# Patient Record
Sex: FEMALE | Race: WHITE | Employment: FULL TIME | URBAN - METROPOLITAN AREA
[De-identification: names, ages, dates, MRNs, and addresses within clinical notes are randomized per-mention and may not be internally consistent; named-entity substitution may affect disease eponyms.]

---

## 2017-02-07 ENCOUNTER — TELEPHONE (OUTPATIENT)
Dept: OBGYN CLINIC | Age: 32
End: 2017-02-07

## 2017-02-07 NOTE — TELEPHONE ENCOUNTER
Pt informed of MD recommendations and she does not want to take the provera so she will take another pregnancy test in one week and if it is negative she will call back for a prescription for her birth control.

## 2017-02-07 NOTE — TELEPHONE ENCOUNTER
I would recommend she repeat the pregnancy test in 1 week, if negative then she can either start her pill then or call for a prescription for provera. She would take the Provera for 10 days and it will  make her start her period. If she starts the pill when she is not on her period she may have breakthrough bleeding.

## 2017-02-07 NOTE — TELEPHONE ENCOUNTER
Pt called stating that she had her LMP on 1/3/17 and that she thinks she is a few days late to start her period. She is not currently taking any birth control. She has taken 2 birth control tests which have come back negative. She really would like to get started on birth control again, however, I told her that if she was pregnant that it might be too early to tell with a urine pregnancy test and that she will not be able to start on birth control until we find out for certain that she is not pregnant. She wanted to know what she needs to do? Please advise.

## 2017-02-17 ENCOUNTER — TELEPHONE (OUTPATIENT)
Dept: OBGYN CLINIC | Age: 32
End: 2017-02-17

## 2017-02-17 NOTE — TELEPHONE ENCOUNTER
Pt called and stated she has not had her period yet. States she did a UPT and was negative. States she would like to start OCP. Advised patient to make an appt to be evaluated by MD. Also,informed patient that  will be back Monday and pt was fine with that. Pt was transferred to scheduling for an appt for Monday.

## 2017-02-20 NOTE — TELEPHONE ENCOUNTER
Can do provera 10 mg 1 po daily x 10 , she should come on her period after this. If she does not then needs to schedule an appointment for exam.  If she does come on her period then she can start ocps then.

## 2017-02-20 NOTE — TELEPHONE ENCOUNTER
Returned call to patient and informed her of MD recommendation. Patient declined to take the Provera but has an appt tomorrow morning to be evaluated instead.      АНДРЕЙ

## 2017-02-21 ENCOUNTER — OFFICE VISIT (OUTPATIENT)
Dept: OBGYN CLINIC | Age: 32
End: 2017-02-21

## 2017-02-21 VITALS
BODY MASS INDEX: 31.07 KG/M2 | DIASTOLIC BLOOD PRESSURE: 74 MMHG | SYSTOLIC BLOOD PRESSURE: 122 MMHG | WEIGHT: 182 LBS | HEIGHT: 64 IN

## 2017-02-21 DIAGNOSIS — N91.2 ABSENT MENSES: Primary | ICD-10-CM

## 2017-02-21 LAB
HCG URINE, QL. (POC): NEGATIVE
VALID INTERNAL CONTROL?: YES

## 2017-02-21 NOTE — PROGRESS NOTES
Amenorrhea note      Galina Hills is a 36 y.o. female who complains of absence of menses. Her current method of family planning is none. The patient is sexually active. She developed this problem approximately 3 weeks ago. Patient's last menstrual period was 01/03/2017. She has had essentially no vaginal bleeding since her LMP. Her previous menses she describes as light, moderate lasting up to 5 days. Pad or tampon count: changes every several hours. Associated symptoms include none. Alleviating factors: none    Aggravating factors: none      Last Pap smear:was normal.    Her relevant past medical history:   Past Medical History   Diagnosis Date    Headache(784.0)     Hypothyroid      resolved after pregnancy    Thyroid disease         Past Surgical History   Procedure Laterality Date    Hx tonsillectomy       Social History     Occupational History    Not on file. Social History Main Topics    Smoking status: Never Smoker    Smokeless tobacco: Never Used    Alcohol use No    Drug use: No    Sexual activity: Yes     Partners: Male     Birth control/ protection: None     Family History   Problem Relation Age of Onset    Dementia Maternal Grandmother     Heart Disease Maternal Grandmother     Heart Disease Maternal Grandfather     Cancer Paternal Grandmother        No Known Allergies  Prior to Admission medications    Medication Sig Start Date End Date Taking? Authorizing Provider   norethindrone (MICRONOR) 0.35 mg tablet Take 1 Tab by mouth daily. 1/6/16   Dionicio Cogan, MD   oxyCODONE-acetaminophen (PERCOCET) 5-325 mg per tablet Take 1-2 Tabs by mouth every four (4) hours as needed for Pain. Max Daily Amount: 12 Tabs. 11/20/15   Dionicio Cogan, MD   butalbital-acetaminophen-caffeine (FIORICET, ESGIC) -40 mg per tablet Take 1 Tab by mouth every six (6) hours as needed for Pain. Max Daily Amount: 4 Tabs.  7/27/15   Dionicio Cogan, MD   5 [facility] HYDROXYPROGESTERONE 250 mg by IntraMUSCular route every seven (7) days. 17 Alpha Hydroxyprogesterone 250mg / 1ml (4- 1 ml unit dose syringes) 6/2/15   Dionicio Cogan, MD   PRENATAL VIT W-CA,FE,FA,<1 MG, (PRENATAL VITAMIN PO) Take  by mouth. Historical Provider   levothyroxine (SYNTHROID) 100 mcg tablet Take  by mouth Daily (before breakfast). Historical Provider   propranolol LA (INDERAL LA) 60 mg SR capsule Take 1 Cap by mouth daily. 5/30/13   Jacinto Thrasher MD   rizatriptan (MAXALT-MLT) 10 mg disintegrating tablet Take 1 Tab by mouth once as needed for Migraine for 1 dose.  5/30/13   Renetta Mendoza MD        Review of Systems - History obtained from the patient  Constitutional: negative for weight loss, fever, night sweats  HEENT: negative for hearing loss, earache, congestion, snoring, sorethroat  CV: negative for chest pain, palpitations, edema  Resp: negative for cough, shortness of breath, wheezing  Breast: negative for breast lumps, nipple discharge, galactorrhea  GI: negative for change in bowel habits, abdominal pain, black or bloody stools  : negative for frequency, dysuria, hematuria  MSK: negative for back pain, joint pain, muscle pain  Skin: negative for itching, rash, hives  Neuro: negative for dizziness, headache, confusion, weakness  Psych: negative for anxiety, depression, change in mood  Heme/lymph: negative for bleeding, bruising, pallor      Objective:  Visit Vitals    /74    Ht 5' 4\" (1.626 m)    Wt 182 lb (82.6 kg)    LMP 01/03/2017    BMI 31.24 kg/m2          PHYSICAL EXAMINATION    Constitutional  · Appearance: well-nourished, well developed, alert, in no acute distress    HENT  · Head and Face: appears normal    Neck  · Inspection/Palpation: normal appearance, no masses or tenderness  · Lymph Nodes: no lymphadenopathy present  · Thyroid: gland size normal, nontender, no nodules or masses present on palpation    Gastrointestinal  · Abdominal Examination: abdomen non-tender to palpation, normal bowel sounds, no masses present  · Liver and spleen: no hepatomegaly present, spleen not palpable  · Hernias: no hernias identified    Skin  · General Inspection: no rash, no lesions identified    Neurologic/Psychiatric  · Mental Status:  · Orientation: grossly oriented to person, place and time  · Mood and Affect: mood normal, affect appropriate    Assessment/Plan:   Amenorrhea, possibly due to anovulation this month. Cycles otherwise normal.  Will check tsh, prolactin, hcg. Start on ocps. Instructions given to pt. Handouts given to pt.

## 2017-02-22 LAB
HCG INTACT+B SERPL-ACNC: <1 MIU/ML
PROLACTIN SERPL-MCNC: 9.4 NG/ML (ref 4.8–23.3)
T4 FREE SERPL-MCNC: 1.16 NG/DL (ref 0.82–1.77)
TSH SERPL DL<=0.005 MIU/L-ACNC: 3.34 UIU/ML (ref 0.45–4.5)

## 2018-01-19 ENCOUNTER — OFFICE VISIT (OUTPATIENT)
Dept: NEUROLOGY | Age: 33
End: 2018-01-19

## 2018-01-19 VITALS
WEIGHT: 196 LBS | SYSTOLIC BLOOD PRESSURE: 122 MMHG | DIASTOLIC BLOOD PRESSURE: 78 MMHG | OXYGEN SATURATION: 99 % | HEART RATE: 79 BPM | BODY MASS INDEX: 33.64 KG/M2

## 2018-01-19 DIAGNOSIS — G43.111 INTRACTABLE MIGRAINE WITH AURA WITH STATUS MIGRAINOSUS: Primary | ICD-10-CM

## 2018-01-19 DIAGNOSIS — G44.86 CERVICOGENIC HEADACHE: ICD-10-CM

## 2018-01-19 RX ORDER — PROPRANOLOL HYDROCHLORIDE 60 MG/1
60 CAPSULE, EXTENDED RELEASE ORAL DAILY
Qty: 30 CAP | Refills: 5 | Status: SHIPPED | OUTPATIENT
Start: 2018-01-19

## 2018-01-19 NOTE — MR AVS SNAPSHOT
[address] [facility] [address] [facility] [address] [facility] [id number] [phone] Patient: Jb Hare MRN: [id number] :1985 Visit Information Date & Time Provider Department Dept. Phone Encounter #  
 2018  9:00 AM Jessica Bullock MD [facility] [phone] [id number] Follow-up Instructions Return in about 1 month (around 2018). Upcoming Health Maintenance Date Due Influenza Age 5 to Adult 2017 PAP AKA CERVICAL CYTOLOGY 2019 DTaP/Tdap/Td series (2 - Td) 2025 Allergies as of 2018  Review Complete On: 2018 By: Andrzej Whitney LPN No Known Allergies Current Immunizations  Reviewed on 2015 Name Date Influenza Vaccine [place]) 10/19/2015 Rho(D) Immune Globulin - IM 2015 Tdap 2015 Not reviewed this visit You Were Diagnosed With   
  
 Codes Comments Intractable migraine with aura with status migrainosus    -  Primary ICD-10-CM: A11.073 ICD-9-CM: 346.03 Cervicogenic headache     ICD-10-CM: Isabella Tong ICD-9-CM: 864. 0 Vitals BP Pulse Weight(growth percentile) SpO2 BMI OB Status 122/78 79 196 lb (88.9 kg) 99% 33.64 kg/m2 Unknown Smoking Status Never Smoker BMI and BSA Data Body Mass Index Body Surface Area  
 33.64 kg/m 2 2 m 2 Preferred Pharmacy Pharmacy Name Phone [facility] [address] AT [address] [facility] [phone] Your Updated Medication List  
  
   
This list is accurate as of: 18 10:00 AM.  Always use your most recent med list.  
  
  
  
  
 propranolol LA 60 mg SR capsule Commonly known as:  INDERAL LA Take 1 Cap by mouth daily. Prescriptions Sent to Pharmacy  Refills  
 propranolol LA (INDERAL LA) 60 mg SR capsule 5  
 Sig: Take 1 Cap by mouth daily. Class: Normal  
 Pharmacy: [facility] [address] [place] [address] [id number] AT [facility] [address] ([facility] #: [phone] Route: Oral  
  
We Performed the Following REFERRAL TO PHYSICAL THERAPY [KYS33 Custom] Comments:  
 Evaluate and treat for L upper neck tightness and pain; Needs myofascial release and heat therapy Follow-up Instructions Return in about 1 month (around 2/19/2018). Referral Information Referral ID Referred By [facility]  
  
 [id number] Status Start Date End Date 1 New Request 1/19/18 1/19/19 If your referral has a status of pending review or denied, additional information will be sent to support the outcome of this decision. Patient Instructions PRESCRIPTION REFILL POLICY [facility] Statement to Patients April 1, 2014 In an effort to ensure the large volume of patient prescription refills is processed in the most efficient and expeditious manner, we are asking our patients to assist us by calling your Pharmacy for all prescription refills, this will include also your  Mail Order Pharmacy. The pharmacy will contact our office electronically to continue the refill process. Please do not wait until the last minute to call your pharmacy. We need at least 48 hours (2days) to fill prescriptions. We also encourage you to call your pharmacy before going to  your prescription to make sure it is ready. With regard to controlled substance prescription refill requests (narcotic refills) that need to be picked up at our office, we ask your cooperation by providing us with at least 72 hours (3days) notice that you will need a refill. We will not refill narcotic prescription refill requests after 4:00pm on any weekday, Monday through Thursday, or after 2:00pm on Fridays, or on the weekends. We encourage everyone to explore another way of getting your prescription refill request processed using [employer], our patient web portal through our electronic medical record system. [employer] is an efficient and effective way to communicate your medication request directly to the office and  downloadable as an kevin on your smart phone . [employer] also features a review functionality that allows you to view your medication list as well as leave messages for your physician. Are you ready to get connected? If so please review the attatched instructions or speak to any of our staff to get you set up right away! Thank you so much for your cooperation. Should you have any questions please contact our Practice Administrator. The Physicians and Staff,  [facility] Introducing [facility] SERVICES! [facility] introduces [employer] patient portal. Now you can access parts of your medical record, email your doctor's office, and request medication refills online. 1. In your internet browser, go to [email or web address]: [employer] 2. Click on the First Time User? Click Here link in the Sign In box. You will see the New Member Sign Up page. 3. Enter your [employer] Access Code exactly as it appears below. You will not need to use this code after youve completed the sign-up process. If you do not sign up before the expiration date, you must request a new code. · [employer] Access Code: [email or web address] Expires: 4/19/2018  8:44 AM 
 
4. Enter the last four digits of your Social Security Number (xxxx) and Date of Birth (mm/dd/yyyy) as indicated and click Submit. You will be taken to the next sign-up page. 5. Create a [employer] ID. This will be your [employer] login ID and cannot be changed, so think of one that is secure and easy to remember. 6. Create a [employer] password. You can change your password at any time. 7. Enter your Password Reset Question and Answer.  This can be used at a later time if you forget your password. 8. Enter your e-mail address. You will receive e-mail notification when new information is available in [address] 9. Click Sign Up. You can now view and download portions of your medical record. 10. Click the Download Summary menu link to download a portable copy of your medical information. If you have questions, please visit the Frequently Asked Questions section of the [email or web address] website. Remember, [email or web address] is NOT to be used for urgent needs. For medical emergencies, dial 911. Now available from your iPhone and Android! Please provide this summary of care documentation to your next provider. Your primary care clinician is listed as PROVIDER UNKNOWN. If you have any questions after today's visit, please call [phone].

## 2018-01-19 NOTE — PROGRESS NOTES
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 HPI:     Since 17 yo, patient has been complaining of headache described as bitemporal, lasting for 6 hrs, occurring 25/ month related to menstruation (+) nausea (+) vomiting (+) photophobia (+) phonophobia (+) visual auras - flashes of light. In 2012, patient noted L occipital headache lasting for several days    (+) neck pain    (+) headache when pooping lasting for few secs. Started few months. Patient saw Dr Juliette Dean in 2013 and was placed on Inderal LA 60 mg with benefit . Maxalt made her sick. Imitrex did not help. Stopped Inderal in 2015 when she got pregnant. Patient went to [facility] ER due to persistent headache. CT head was said to be okay. 1 week ago, patient was started on Inderal LA 60 mg with no benefit yet.         ROS  All other systems reviewed and are negative  (-) fever  (-) rash    Past Medical Hx  Past Medical History:   Diagnosis Date    Gestational hypertension     Headache(784.0)     Hypothyroid     resolved after pregnancy    Thyroid disease        Social Hx  Social History     Social History    Marital status:      Spouse name: N/A    Number of children: N/A    Years of education: N/A     Social History Main Topics    Smoking status: Never Smoker    Smokeless tobacco: Never Used    Alcohol use No    Drug use: No    Sexual activity: Yes     Partners: Male     Birth control/ protection: None     Other Topics Concern    None     Social History Narrative       Family Hx  Family History   Problem Relation Age of Onset    Dementia Maternal Grandmother     Heart Disease Maternal Grandmother     Heart Disease Maternal Grandfather     Cancer Paternal Grandmother    Mother - headaches    ALLERGIES  No Known Allergies    CURRENT MEDS  Current Outpatient Prescriptions   Medication Sig Dispense Refill    propranolol LA (INDERAL LA) 60 mg SR capsule Take 1 Cap by mouth daily. 30 Cap 5     Ibuprofen      PREVIOUS WORKUP: (reviewed)  IMAGING:    CT Results (recent):  No results found for this or any previous visit. MRI Results (recent):  No results found for this or any previous visit. IR Results (recent):  No results found for this or any previous visit. VAS/US Results (recent):  No results found for this or any previous visit. LABS (reviewed)  Results for orders placed or performed in visit on 02/21/17   TSH AND FREE T4   Result Value Ref Range    TSH 3.340 0.450 - 4.500 uIU/mL    T4, Free 1.16 0.82 - 1.77 ng/dL   PROLACTIN   Result Value Ref Range    Prolactin 9.4 4.8 - 23.3 ng/mL   TOTAL HCG, QT. Result Value Ref Range    HCG, beta, QT <1 mIU/mL   AMB POC URINE PREGNANCY TEST, VISUAL COLOR COMPARISON   Result Value Ref Range    VALID INTERNAL CONTROL POC Yes     HCG urine, Ql. (POC) Negative Negative       Physical Exam:     Visit Vitals    /78    Pulse 79    Wt 88.9 kg (196 lb)    SpO2 99%    BMI 33.64 kg/m2     General:  Alert, cooperative, no distress. Head:  Normocephalic, without obvious abnormality, atraumatic. Eyes:  Conjunctivae/corneas clear. Lungs:  Heart:   Non labored breathing  Regular rate and rhythm, no carotid bruits   Abdomen:   Soft, non-distended   Extremities: Extremities normal, atraumatic, no cyanosis or edema. Pulses: 2+ and symmetric all extremities. Skin: Skin color, texture, turgor normal. No rashes or lesions.   Neurologic Exam     Gen: Attention normal             Language: naming, repetition, fluency normal             Memory: intact recent and remote memory  Cranial Nerves:  I: smell Not tested   II: visual fields Full to confrontation   II: pupils Equal, round, reactive to light   II: optic disc No papilledema   III,VII: ptosis none   III,IV,VI: extraocular muscles  Full ROM   V: mastication normal   V: facial light touch sensation  normal   VII: facial muscle function   symmetric   VIII: hearing symmetric   IX: soft palate elevation  normal   XI: trapezius strength  5/5   XI: sternocleidomastoid strength 5/5   XI: neck flexion strength  5/5   XII: tongue  midline     Motor: normal bulk and tone, no tremor              Strength: 5/5 all four extremities  (+) tightness and tenderness L upper neck/ occipital area  Sensory: intact to LT, PP, vibration, and JPS  Reflexes: 2+ throughout; Down going toes  Coordination: Good FTN and HTS  Gait: normal gait including tandem            Impression:     Pollo Parker is a 29 y.o. female who  has a past medical history of Gestational hypertension who comes in with several types of headache. Since 17 yo, patient has been complaining of headache described as bitemporal, lasting for 6 hrs, occurring 25/ month related to menstruation associated with nausea, vomiting,  Photophobia, phonophobia and visual auras - flashes of light consistent with migraine, with visual auras, intractable. In 2012, patient noted L occipital headache lasting for several days associated with L upper neck pain and tightness suggestive of a superimposed L cervicogenic headache or possible occipital neuralgia. Lastly, patient has headache during defecation concerning for exertional headache. Patient may also have chronic caffeine headache Patient saw Dr Blanca Vasquez in 2013 and was placed on Inderal LA 60 mg with benefit . Maxalt made her sick. Imitrex did not help. Stopped Inderal in 2015 when she got pregnant. RECOMMENDATIONS  1. I had a long discussion with patient. Discussed diagnosis, prognosis, pathophysiology and available treatment. Reviewed test results. All questions were answered. 2. I personally reviewed the CT head images with the patient. Assured patient no evidence of mass or bleeding  3.  Continue Inderal LA 60 mg as migraine prophylaxis which worked in the past  4. Given samples of Cambia and Zipsor to try to abort headaches  5. Discussed the need for headache diary and went through the list that can trigger headache (chocolate, caffeine, MSG)  6. Advise to switch to decaf  7. Physical therapy referral for her L upper neck pain  8. Will consider trigger point injection/ L occipital nerve block if no improvement despite above      Follow-up Disposition:  Return in about 1 month (around 2/19/2018).       Thank you for the consultation      Luz Conway MD  Diplomate, American Board of Psychiatry and Neurology  Diplomate, Neuromuscular Medicine  Diplomate, American Board of Electrodiagnostic Medicine        CC: PROVIDER UNKNOWN  Fax: None

## 2020-09-28 ENCOUNTER — TELEPHONE (OUTPATIENT)
Dept: OBGYN CLINIC | Age: 35
End: 2020-09-28

## 2020-09-28 NOTE — TELEPHONE ENCOUNTER
Patient of FW    Patient is calling to say that she has been neglecting coming in and now she realizes what she has going on is not emergent. She said that she has a foul brown discharge vaginally. She had her urine checked somewhere and she does not have a UTI. She wants appointment today, simply because she has missed 4 days out of the last week and she \"happens to be off\". Advised me that she was offered next available FW appointment and wants to be seen today. Advised this is not emergent, but I would see what I could do. She does not want to see a male doctor, and HW is work in. She said she would make due with HW. Offered first available with him, she wants the latest appointment. Advised this is what is available, as this is not emergent and we are trying to work her in. She declined. She thanked me for my help, but declined for now.

## 2020-10-01 NOTE — PATIENT INSTRUCTIONS
Vaginitis: Care Instructions Your Care Instructions Vaginitis is soreness or infection of the vagina. This common problem can cause itching and burning. And it can cause a change in vaginal discharge. Sometimes it can cause pain during sex. Vaginitis may be caused by bacteria, yeast, or other germs. Some infections that cause it are caught from a sexual partner. Bath products, spermicides, and douches can irritate the vagina too. Some women have this problem during and after menopause. A drop in estrogen levels during this time can cause dryness, soreness, and pain during sex. Your doctor can give you medicine to treat an infection. And home care may help you feel better. For certain types of infections, your sex partner must be treated too. Follow-up care is a key part of your treatment and safety. Be sure to make and go to all appointments, and call your doctor if you are having problems. It's also a good idea to know your test results and keep a list of the medicines you take. How can you care for yourself at home? · If your doctor prescribed antibiotics, take them as directed. Do not stop taking them just because you feel better. You need to take the full course of antibiotics. · Take your medicines exactly as prescribed. Call your doctor if you think you are having a problem with your medicine. · Do not eat or drink anything that has alcohol if you are taking metronidazole (Flagyl). · If you have a yeast infection, use over-the-counter products as your doctor tells you to. Or take medicine your doctor prescribes exactly as directed. · Wash your vaginal area daily with water. You also can use a mild, unscented soap if you want. · Do not use scented bath products. And do not use vaginal sprays or douches. · Put a washcloth soaked in cool water on the area to relieve itching. Or you can take cool baths.  
· If you have dryness because of menopause, use estrogen cream or pills that your doctor prescribes. · Ask your doctor about when it is okay to have sex. · Use a personal lubricant before sex if you have dryness. Examples are Astroglide, K-Y Jelly, and Wet Lubricant Gel. · Ask your doctor if your sex partner also needs treatment. When should you call for help? Call your doctor now or seek immediate medical care if: 
  · You have a fever and pelvic pain. Watch closely for changes in your health, and be sure to contact your doctor if: 
  · You have bleeding other than your period.  
  · You do not get better as expected. Where can you learn more? Go to http://www.gray.com/ Enter B514 in the search box to learn more about \"Vaginitis: Care Instructions. \" Current as of: November 8, 2019               Content Version: 12.6 © 2629-9585 [employer]. Care instructions adapted under license by [employer] (which disclaims liability or warranty for this information). If you have questions about a medical condition or this instruction, always ask your healthcare professional. [address] any warranty or liability for your use of this information.

## 2020-10-01 NOTE — PROGRESS NOTES
Chief Complaint   No chief complaint on file. HPI  37 y.o. female complains of dark and bloody vaginal discharge. No LMP recorded. She admits to additional symptoms at this time. Lower back pain  The patient  denies aggravating factors  She denies exposure to new chemicals ot hygenic agents  Previous treatment included:  None  She also reports a right sided low back pain that feels deep and does not feel like it is associated with her muscles. Past Medical History:   Diagnosis Date    Gestational hypertension     Headache(784.0)     Hypothyroid     resolved after pregnancy    Migraine with aura 01/2019    Thyroid disease      Past Surgical History:   Procedure Laterality Date    HX TONSILLECTOMY       Social History     Occupational History    Not on file   Tobacco Use    Smoking status: Never Smoker    Smokeless tobacco: Never Used   Substance and Sexual Activity    Alcohol use: No    Drug use: No    Sexual activity: Yes     Partners: Male     Birth control/protection: None     Family History   Problem Relation Age of Onset    Dementia Maternal Grandmother     Heart Disease Maternal Grandmother     Heart Disease Maternal Grandfather     Cancer Paternal Grandmother         No Known Allergies  Prior to Admission medications    Medication Sig Start Date End Date Taking? Authorizing Provider   propranolol LA (INDERAL LA) 60 mg SR capsule Take 1 Cap by mouth daily.  1/19/18   Rebekah Jenkins MD                      Review of Systems - History obtained from the patient  Constitutional: negative for weight loss, fever, night sweats  Breast: negative for breast lumps, nipple discharge, galactorrhea  GI: negative for change in bowel habits, abdominal pain, black or bloody stools  : negative for frequency, dysuria, hematuria  MSK: negative for back pain, joint pain, muscle pain  Skin: negative for itching, rash, hives  Neuro: negative for dizziness, headache, confusion, weakness  Psych: negative for anxiety, depression, change in mood  Heme/lymph: negative for bleeding, bruising, pallor       Objective:    Physical Exam:   PHYSICAL EXAMINATION    Constitutional  · Appearance: well-nourished, well developed, alert, in no acute distress    HENT  · Head and Face: appears normal    Genitourinary  · External Genitalia: normal appearance for age, + discharge present, no tenderness present, no inflammatory lesions present, no masses present, no atrophy present  · Vagina:  + discharge present, otherwise normal vaginal vault without central or paravaginal defects, no inflammatory lesions present, no masses present  · Bladder: non-tender to palpation  · Urethra: appears normal  · Cervix: normal   · Uterus: normal size, shape and consistency  · Adnexa: no adnexal tenderness present, no adnexal masses present  · Perineum: perineum within normal limits, no evidence of trauma, no rashes or skin lesions present  · Anus: anus within normal limits, no hemorrhoids present  · Inguinal Lymph Nodes: no lymphadenopathy present    Skin  · General Inspection: no rash, no lesions identified    Neurologic/Psychiatric  · Mental Status:  · Orientation: grossly oriented to person, place and time  · Mood and Affect: mood normal, affect appropriate    Assessment:   Lower back pain- will rto for ultraosund, if normal then will f/u with urology  Vaginal discharge- will f/u with nuswab    Plan:   ROV prn if symptoms persist or worsen.

## 2020-10-02 ENCOUNTER — OFFICE VISIT (OUTPATIENT)
Dept: OBGYN CLINIC | Age: 35
End: 2020-10-02
Payer: COMMERCIAL

## 2020-10-02 DIAGNOSIS — N76.0 VAGINITIS AND VULVOVAGINITIS: ICD-10-CM

## 2020-10-02 DIAGNOSIS — N89.8 VAGINAL DISCHARGE: Primary | ICD-10-CM

## 2020-10-02 PROCEDURE — 99213 OFFICE O/P EST LOW 20 MIN: CPT | Performed by: OBSTETRICS & GYNECOLOGY

## 2020-10-06 LAB
A VAGINAE DNA VAG QL NAA+PROBE: NORMAL SCORE
BVAB2 DNA VAG QL NAA+PROBE: NORMAL SCORE
C ALBICANS DNA VAG QL NAA+PROBE: NEGATIVE
C GLABRATA DNA VAG QL NAA+PROBE: NEGATIVE
MEGA1 DNA VAG QL NAA+PROBE: NORMAL SCORE

## 2020-10-13 ENCOUNTER — TELEPHONE (OUTPATIENT)
Dept: OBGYN CLINIC | Age: 35
End: 2020-10-13

## 2020-10-13 NOTE — TELEPHONE ENCOUNTER
Patient of     Calling for lab results:  10/2/20:        Paulette Paiz VAGINOSIS + CANDIDA [[id number]] (Order [id number])   Lab   Date: 10/2/2020  Department: [facility]  Ordering/Authorizing: Ahmed Curling, MD       NUSWAB VAGINOSIS + CANDIDA: Result Notes       Ahmed Curling, MD   10/6/2020 11:23 AM       Results normal, reviewed, notify pt

## 2021-05-24 ENCOUNTER — OFFICE VISIT (OUTPATIENT)
Dept: OBGYN CLINIC | Age: 36
End: 2021-05-24
Payer: COMMERCIAL

## 2021-05-24 VITALS — WEIGHT: 212 LBS | BODY MASS INDEX: 36.39 KG/M2 | SYSTOLIC BLOOD PRESSURE: 130 MMHG | DIASTOLIC BLOOD PRESSURE: 84 MMHG

## 2021-05-24 DIAGNOSIS — Z01.419 WELL FEMALE EXAM WITH ROUTINE GYNECOLOGICAL EXAM: Primary | ICD-10-CM

## 2021-05-24 PROCEDURE — 99395 PREV VISIT EST AGE 18-39: CPT | Performed by: OBSTETRICS & GYNECOLOGY

## 2021-05-24 RX ORDER — DROSPIRENONE 4 MG/1
1 TABLET, FILM COATED ORAL DAILY
Qty: 3 PACKAGE | Refills: 4 | Status: SHIPPED | OUTPATIENT
Start: 2021-05-24

## 2021-05-24 RX ORDER — METFORMIN HYDROCHLORIDE 500 MG/1
TABLET ORAL 2 TIMES DAILY WITH MEALS
COMMUNITY

## 2021-05-24 NOTE — PROGRESS NOTES
Annual exam ages 21-44    [address] [facility] is a ,  37 y.o. female   Patient's last menstrual period was 2021. She presents for her annual checkup. She is having significant cycle concerns and PMS. Menstrual status:    Her periods are occurring every 6-8 weeks. She does not have dysmenorrhea. She reports premenstrual symptoms. Contraception:    The current method of family planning is withdrawal.    Sexual history:    She  reports being sexually active and has had partner(s) who are Male. She reports using the following method of birth control/protection: None. Medical conditions:    Since her last annual GYN exam about five years ago, she has not the following changes in her health history: none. Surgical history confirmed with patient. has a past surgical history that includes hx tonsillectomy. Pap and Mammogram History:    Her most recent Pap smear was normal, obtained 5 year(s) ago. The patient has never had a mammogram.    Breast Cancer History/Substance Abuse: negative    Past Medical History:   Diagnosis Date    Gestational hypertension     Headache(784.0)     Hypothyroid     resolved after pregnancy    Migraine with aura 2019    Thyroid disease      Past Surgical History:   Procedure Laterality Date    HX TONSILLECTOMY         Current Outpatient Medications   Medication Sig Dispense Refill    metFORMIN (GLUCOPHAGE) 500 mg tablet Take  by mouth two (2) times daily (with meals).  propranolol LA (INDERAL LA) 60 mg SR capsule Take 1 Cap by mouth daily. (Patient not taking: Reported on 2021) 30 Cap 5     Allergies: Patient has no known allergies. Tobacco History:  reports that she has never smoked. She has never used smokeless tobacco.  Alcohol Abuse:  reports no history of alcohol use. Drug Abuse:  reports no history of drug use.     Family Medical/Cancer History:   Family History   Problem Relation Age of Onset    Dementia Maternal Grandmother     Heart Disease Maternal Grandmother     Heart Disease Maternal Grandfather     Cancer Paternal Grandmother         Review of Systems - History obtained from the patient  Constitutional: negative for weight loss, fever, night sweats  HEENT: negative for hearing loss, earache, congestion, snoring, sorethroat  CV: negative for chest pain, palpitations, edema  Resp: negative for cough, shortness of breath, wheezing  GI: negative for change in bowel habits, abdominal pain, black or bloody stools  : negative for frequency, dysuria, hematuria, vaginal discharge  MSK: negative for back pain, joint pain, muscle pain  Breast: negative for breast lumps, nipple discharge, galactorrhea  Skin :negative for itching, rash, hives  Neuro: negative for dizziness, headache, confusion, weakness  Psych: negative for anxiety, depression, change in mood  Heme/lymph: negative for bleeding, bruising, pallor    Physical Exam    Visit Vitals  /84   Wt 212 lb (96.2 kg)   LMP 05/12/2021   BMI 36.39 kg/m²       Constitutional  · Appearance: well-nourished, well developed, alert, in no acute distress    HENT  · Head and Face: appears normal    Neck  · Inspection/Palpation: normal appearance, no masses or tenderness  · Lymph Nodes: no lymphadenopathy present  · Thyroid: gland size normal, nontender, no nodules or masses present on palpation    Chest  · Respiratory Effort: breathing unlabored    Breasts  · Inspection of Breasts: breasts symmetrical, no skin changes, no discharge present, nipple appearance normal, no skin retraction present  · Palpation of Breasts and Axillae: no masses present on palpation, no breast tenderness  · Axillary Lymph Nodes: no lymphadenopathy present    Gastrointestinal  · Abdominal Examination: abdomen non-tender to palpation, normal bowel sounds, no masses present  · Liver and spleen: no hepatomegaly present, spleen not palpable  · Hernias: no hernias identified    Genitourinary  · External Genitalia: normal appearance for age, no discharge present, no tenderness present, no inflammatory lesions present, no masses present, no atrophy present  · Vagina: normal vaginal vault without central or paravaginal defects, no discharge present, no inflammatory lesions present, no masses present  · Bladder: non-tender to palpation  · Urethra: appears normal  · Cervix: normal   · Uterus: normal size, shape and consistency  · Adnexa: no adnexal tenderness present, no adnexal masses present  · Perineum: perineum within normal limits, no evidence of trauma, no rashes or skin lesions present  · Anus: anus within normal limits, no hemorrhoids present  · Inguinal Lymph Nodes: no lymphadenopathy present    Skin  · General Inspection: no rash, no lesions identified    Neurologic/Psychiatric  · Mental Status:  · Orientation: grossly oriented to person, place and time  · Mood and Affect: mood normal, affect appropriate    Assessment:  Routine gynecologic examination  Her current medical status is satisfactory with no evidence of significant gynecologic issues. H/o migraine with aura  + PMS- will start slynd.   If not covered then will try micronor    Plan:  Counseled re: diet, exercise, healthy lifestyle  Return for yearly wellness visits  Gardisil counseling provided  Pt counseled regarding co-testing for high risk HPV with pap  Rec screening mammo at either 37 or 42

## 2021-05-24 NOTE — PATIENT INSTRUCTIONS
Pap Test: Care Instructions Overview The Pap test (also called a Pap smear) is a screening test for cancer of the cervix, which is the lower part of the uterus that opens into the vagina. The test can help your doctor find early changes in the cells that could lead to cancer. The sample of cells taken during your test has been sent to a lab so that an expert can look at the cells. It usually takes a week or two to get the results back. Follow-up care is a key part of your treatment and safety. Be sure to make and go to all appointments, and call your doctor if you are having problems. It's also a good idea to know your test results and keep a list of the medicines you take. What do the results mean? · A normal result means that the test did not find any abnormal cells in the sample. · An abnormal result can mean many things. Most of these are not cancer. The results of your test may be abnormal because: ? You have an infection of the vagina or cervix, such as a yeast infection. ? You have an IUD (intrauterine device for birth control). ? You have low estrogen levels after menopause that are causing the cells to change. ? You have cell changes that may be a sign of precancer or cancer. The results are ranked based on how serious the changes might be. There are many other reasons why you might not get a normal result. If the results were abnormal, you may need to get another test within a few weeks or months. If the results show changes that could be a sign of cancer, you may need a test called a colposcopy, which provides a more complete view of the cervix. Sometimes the lab cannot use the sample because it does not contain enough cells or was not preserved well. If so, you may need to have the test again. This is not common, but it does happen from time to time. When should you call for help?  
Watch closely for changes in your health, and be sure to contact your doctor if: 
  · You have vaginal bleeding or pain for more than 2 days after the test. It is normal to have a small amount of bleeding for a day or two after the test.  
Where can you learn more? Go to http://www.gray.com/ Enter L533 in the search box to learn more about \"Pap Test: Care Instructions. \" Current as of: December 17, 2020               Content Version: 12.8 © 2006-2021 [employer]. Care instructions adapted under license by [employer] (which disclaims liability or warranty for this information). If you have questions about a medical condition or this instruction, always ask your healthcare professional. [place] [address] any warranty or liability for your use of this information.

## 2021-05-26 LAB
CYTOLOGIST CVX/VAG CYTO: NORMAL
CYTOLOGY CVX/VAG DOC CYTO: NORMAL
CYTOLOGY CVX/VAG DOC THIN PREP: NORMAL
CYTOLOGY HISTORY:: NORMAL
DX ICD CODE: NORMAL
HPV I/H RISK 4 DNA CVX QL PROBE+SIG AMP: NEGATIVE
Lab: NORMAL
OTHER STN SPEC: NORMAL
STAT OF ADQ CVX/VAG CYTO-IMP: NORMAL

## 2022-03-18 PROBLEM — G44.86 CERVICOGENIC HEADACHE: Status: ACTIVE | Noted: 2018-01-19

## 2022-03-19 PROBLEM — G43.111 INTRACTABLE MIGRAINE WITH AURA WITH STATUS MIGRAINOSUS: Status: ACTIVE | Noted: 2018-01-19

## 2024-11-01 ENCOUNTER — HOSPITAL ENCOUNTER (EMERGENCY)
Facility: HOSPITAL | Age: 39
Discharge: HOME | End: 2024-11-01
Attending: EMERGENCY MEDICINE
Payer: COMMERCIAL

## 2024-11-01 ENCOUNTER — APPOINTMENT (OUTPATIENT)
Facility: HOSPITAL | Age: 39
End: 2024-11-01
Payer: COMMERCIAL

## 2024-11-01 VITALS
WEIGHT: 185 LBS | HEIGHT: 63 IN | OXYGEN SATURATION: 98 % | RESPIRATION RATE: 17 BRPM | SYSTOLIC BLOOD PRESSURE: 118 MMHG | BODY MASS INDEX: 32.78 KG/M2 | DIASTOLIC BLOOD PRESSURE: 79 MMHG | HEART RATE: 91 BPM | TEMPERATURE: 98 F

## 2024-11-01 DIAGNOSIS — R20.2 ARM PARESTHESIA, RIGHT: ICD-10-CM

## 2024-11-01 DIAGNOSIS — R29.898 RIGHT ARM WEAKNESS: ICD-10-CM

## 2024-11-01 DIAGNOSIS — M54.2 NECK PAIN: Primary | ICD-10-CM

## 2024-11-01 PROCEDURE — 72141 MRI NECK SPINE W/O DYE: CPT

## 2024-11-01 PROCEDURE — 99284 EMERGENCY DEPT VISIT MOD MDM: CPT

## 2024-11-01 RX ORDER — LIDOCAINE 4 G/G
1 PATCH TOPICAL DAILY
Qty: 10 PATCH | Refills: 0 | Status: SHIPPED | OUTPATIENT
Start: 2024-11-01

## 2024-11-01 RX ORDER — KETOROLAC TROMETHAMINE 30 MG/ML
30 INJECTION, SOLUTION INTRAMUSCULAR; INTRAVENOUS
Status: DISCONTINUED | OUTPATIENT
Start: 2024-11-01 | End: 2024-11-01 | Stop reason: HOSPADM

## 2024-11-01 RX ORDER — LIDOCAINE 4 G/G
1 PATCH TOPICAL
Status: DISCONTINUED | OUTPATIENT
Start: 2024-11-01 | End: 2024-11-01 | Stop reason: HOSPADM

## 2024-11-01 RX ORDER — KETOROLAC TROMETHAMINE 10 MG/1
10 TABLET, FILM COATED ORAL EVERY 6 HOURS PRN
Qty: 20 TABLET | Refills: 0 | Status: SHIPPED | OUTPATIENT
Start: 2024-11-01

## 2024-11-01 ASSESSMENT — PAIN DESCRIPTION - LOCATION: LOCATION: SHOULDER;ARM;NECK

## 2024-11-01 ASSESSMENT — PAIN DESCRIPTION - ORIENTATION: ORIENTATION: RIGHT

## 2024-11-01 ASSESSMENT — PAIN SCALES - GENERAL: PAINLEVEL_OUTOF10: 3

## 2024-11-01 ASSESSMENT — PAIN - FUNCTIONAL ASSESSMENT: PAIN_FUNCTIONAL_ASSESSMENT: 0-10

## 2024-11-01 NOTE — DISCHARGE INSTRUCTIONS
Thank you for allowing us to provide you with medical care today.  We realize that you have many choices for your emergency care needs.  We thank you for choosing [facility].  Please choose us in the future for any continued health care needs.     We hope we addressed all of your medical concerns. We strive to provide excellent quality care in the Emergency Department.  Anything less than excellent does not meet our expectations.     The exam and treatment you received in the Emergency Department were for an emergent problem and are not intended as complete care. It is important that you follow up with a doctor, nurse practitioner, or physician’s assistant for ongoing care. If your symptoms worsen or you do not improve as expected and you are unable to reach your usual health care provider, you should return to the Emergency Department. We are available 24 hours a day.     Take this sheet with you when you go to your follow-up visit.     If you have any problem arranging the follow-up visit, contact the Emergency Department immediately.     Make an appointment your family doctor for follow up of this visit. Return to the ER if you are unable to be seen in a timely manner.     Below is a summary of your results.    Labs  No results found for this or any previous visit (from the past 12 hour(s)).    Radiologic Studies  MRI CERVICAL SPINE WO CONTRAST   Final Result   No acute abnormality or high-grade spinal canal or neuroforaminal narrowing.    Multiple subcentimeter perineural cysts as detailed.         Electronically signed by DIO BARTLETT

## 2024-11-01 NOTE — ED PROVIDER NOTES
Conjunctivae normal.   Neck:      Vascular: No carotid bruit.      Trachea: Trachea and phonation normal.     Cardiovascular:      Rate and Rhythm: Normal rate and regular rhythm.      Pulses: Normal pulses.           Radial pulses are 2+ on the right side and 2+ on the left side.      Heart sounds: Normal heart sounds. No murmur heard.  Pulmonary:      Effort: Pulmonary effort is normal. No respiratory distress.      Breath sounds: Normal breath sounds.   Abdominal:      General: There is no distension.      Palpations: Abdomen is soft.      Tenderness: There is no abdominal tenderness. There is no guarding.   Musculoskeletal:         General: Normal range of motion.      Cervical back: Normal range of motion and neck supple. Tenderness present. No rigidity. Pain with movement and muscular tenderness present. No spinous process tenderness. Normal range of motion.      Right lower leg: No edema.      Left lower leg: No edema.   Lymphadenopathy:      Cervical: No cervical adenopathy.      Right cervical: No superficial cervical adenopathy.     Left cervical: No superficial cervical adenopathy.   Skin:     General: Skin is warm and dry.   Neurological:      General: No focal deficit present.      Mental Status: She is alert and oriented to person, place, and time.      GCS: GCS eye subscore is 4. GCS verbal subscore is 5. GCS motor subscore is 6.      Cranial Nerves: Cranial nerves 2-12 are intact.      Sensory: Sensory deficit present.      Motor: Weakness present.      Coordination: Coordination is intact.      Gait: Gait is intact.      Comments: Right upper extremity 4/5 strength.  Endorses right sensation reduced from left.   Psychiatric:         Mood and Affect: Mood normal.         Behavior: Behavior normal.         DIAGNOSTIC RESULTS     EKG: All EKG's are interpreted by the Emergency Department Physician who either signs or Co-signs this chart in the absence of a cardiologist.  EKG:.Not  [id number]  [phone]        DISCHARGE MEDICATIONS:  Discharge Medication List as of 11/1/2024  6:56 PM        START taking these medications    Details   ketorolac (TORADOL) 10 MG tablet Take 1 tablet by mouth every 6 hours as needed for Pain, Disp-20 tablet, R-0Normal      lidocaine (HM LIDOCAINE PATCH) 4 % external patch Place 1 patch onto the skin daily, TransDERmal, DAILY Starting Fri 11/1/2024, Disp-10 patch, R-0, Normal           (Please note that parts of this dictation were completed with voice recognition software. Quite often unanticipated grammatical, syntax, homophones, and other interpretive errors are inadvertently transcribed by the computer software. Efforts were made to edit the dictation but occasionally words remain mis-transcribed.)    PATRICK Mireles NP (electronically signed)  Emergency Attending Physician / Physician Assistant / Nurse Practitioner     Cathy Lua APRN - NP  11/01/24 [id number]

## 2024-11-01 NOTE — ED TRIAGE NOTES
Pt arrives to the ER for complaints of neck pain that started about three weeks ago.     Reports that she was at [facility] on Sunday. Pt states that she was told to see neurology and is able to see them in May however, pt reports that she is unable to to deal with the discomfort.     Reports that she has finger numbness to right hand and pain to neck. Pt states that she is only able to sleep in a chair.       Pt reports that the pain initially started in June when building an above ground pool.

## 2024-12-10 ENCOUNTER — HOSPITAL ENCOUNTER (OUTPATIENT)
Facility: HOSPITAL | Age: 39
Discharge: HOME | End: 2024-12-13
Attending: NEUROLOGICAL SURGERY
Payer: COMMERCIAL

## 2024-12-10 VITALS — WEIGHT: 180 LBS | BODY MASS INDEX: 31.89 KG/M2

## 2024-12-10 DIAGNOSIS — Q27.9 VASCULAR MALFORMATION: ICD-10-CM

## 2024-12-10 PROCEDURE — 6360000004 HC RX CONTRAST MEDICATION: Performed by: RADIOLOGY

## 2024-12-10 PROCEDURE — 70553 MRI BRAIN STEM W/O & W/DYE: CPT

## 2024-12-10 PROCEDURE — 70544 MR ANGIOGRAPHY HEAD W/O DYE: CPT

## 2024-12-10 PROCEDURE — A9579 GAD-BASE MR CONTRAST NOS,1ML: HCPCS | Performed by: RADIOLOGY

## 2024-12-10 RX ADMIN — GADOTERIDOL 16 ML: 279.3 INJECTION, SOLUTION INTRAVENOUS at 06:38

## 2025-01-05 ENCOUNTER — TELEPHONE (OUTPATIENT)
Age: 40
End: 2025-01-05